# Patient Record
(demographics unavailable — no encounter records)

---

## 2025-01-25 NOTE — REASON FOR VISIT
[Symptom and Test Evaluation] : symptom and test evaluation [FreeTextEntry1] : 48 y/o M with no PMH presents for cardiac eval for symptoms of palpitations   He notes that he has been having palpitations for approx 3 months, its daily, random comes and goes not often but feels dizziness  Shortness of breath - works out and get up quick and shortness of breath  Walks a mile every morning and no shortness of breath but when getting up the or going up stairs he feels shortness of breath   Noting has had to stop working out 1 month of having chest discomfort and has to stop  no swelling of the lower extremity orthopnea or PND  Was previously diagnosed with hypertensions was on medication and changed diet and had interval imporvement in blood pressure    smoker: no smoking  Family hX: Mother- stroke Afib  and GM stroke  2 yrs ago

## 2025-01-25 NOTE — ASSESSMENT
[FreeTextEntry1] : 46 y/o M with no PMH presents for cardiac eval for symptoms of palpitations   1) Palpitations with exertional shortness of breath  - no chest pain but shortness of breath on exertion with palpitations  - EKG reviewed shows Sinus Rhythm @ 87 bpm with possible Old anteroseptal infarct, age indeterminate  - will refer for 3 day holter monitor  - refer for TTE to assess LV function and vaulvopathy  - refer for exercise stress test and assess for any EKG changes with exercise and see if there is a hypertensive response  - requesting blood work CBC CMP TSH LipidPanel and Lipoprotein A   Maria E Stuart D.O. [facility] Cardiology/Vascular Cardiology -[facility] Cardiology Telephone # [phone]

## 2025-01-25 NOTE — CARDIOLOGY SUMMARY
[de-identified] : Sinus Rhythm @ 87 bpm with possible Old anteroseptal infarct, age indeterminate

## 2025-01-25 NOTE — ASSESSMENT
[FreeTextEntry1] : 46 y/o M with no PMH presents for cardiac eval for symptoms of palpitations   1) Palpitations with exertional shortness of breath  - no chest pain but shortness of breath on exertion with palpitations  - EKG reviewed shows Sinus Rhythm @ 87 bpm with possible Old anteroseptal infarct, age indeterminate  - will refer for 3 day holter monitor  - refer for TTE to assess LV function and vaulvopathy  - refer for exercise stress test and assess for any EKG changes with exercise and see if there is a hypertensive response  - requesting blood work CBC CMP TSH LipidPanel and Lipoprotein A   Maria E Stuart D.O. [facility] Cardiology/Vascular Cardiology -[facility] Cardiology Telephone # [phone]

## 2025-01-25 NOTE — CARDIOLOGY SUMMARY
[de-identified] : Sinus Rhythm @ 87 bpm with possible Old anteroseptal infarct, age indeterminate

## 2025-03-18 NOTE — ASSESSMENT
[FreeTextEntry1] : 48 y/o M with PMH of Hypertension, Dyslipidemia possibly familial hypercholesterolemia,  and pre DM2 (HgbA1c 6%) presents for cardiac follow up    1) Hypertension  - blood pressure uncontrolled with no headhache dizziness lightheadedness or syncope  - EKG done shows Sinus Rhythm @ 87 bpm with possible Old anteroseptal infarct, age indeterminate;  EKG was obtained to assist in the diagnosed and management of assessed problem(s)  - Exercise stress test reviewed and shows no acute EKG changes associated with ischemia  - TTE done shows moderate LVH with normal diastolic and RV functoin and size  - will recommend to increase the Nifedipine to 90mg po q daily  - encourage dietary and lifestyle modification and Advised to partake in at least 150 mins/week of moderate intensity exercise like brisk walking. - will need to do ambulatory blood pressure checks; if perisstent may consider US renal duplex for further eval   2) LDL > 190, possible familial hypercholesterolemia  - familys hx of CAD and stroke  - based on this with Lipoprotein A 99.1, will refer for genetic testing  - refer for CT calcium score for further assessment  - will start Crestor 20mg and will need to repeat LDL and Liporotein A and if no evidence of adequate decline in LDL by 30-49% will consider further escalating and started ASA 81mg po q daily   3) Pre DM2 - disucssed dietayr and lifestyle modification and should be repeated in 3 motnhs    Patient was advised to contact the office or seek emergency medical care for any new, worsening or concerning symptoms. Patient verbalized understanding and is in agreement with the above plan.  Maria E Stuart D.O. [facility] Cardiology/Vascular Cardiology -[facility] Cardiology Telephone # [phone] Dressing: bandage

## 2025-03-18 NOTE — REASON FOR VISIT
[Symptom and Test Evaluation] : symptom and test evaluation [FreeTextEntry1] : 46 y/o M with PMH of Hypertension, Dyslipidemia possibly familial hypercholesterolemia,  and pre DM2 (HgbA1c 6%) presents for cardiac follow up   patient notes that since the last visit the blood pressure has been uncontrolled and has been incorporating healthy diet and started exercising again with no chest pain or shortness of breath as compared to prior viist.  LDL cholesterol done shows LDL of  225 and Lipoprotein A elevated 99.1 and discussed his underlying risk for CAD and stroke, recommended starting a statin and ASA; based on LDL > 190 will eval with genetic testing for Familial hypercholesterolemia.    smoker: no smoking  Family hX: Mother- stroke Afib  and GM stroke  2 yrs ago

## 2025-03-18 NOTE — ASSESSMENT
[FreeTextEntry1] : 46 y/o M with PMH of Hypertension, Dyslipidemia possibly familial hypercholesterolemia,  and pre DM2 (HgbA1c 6%) presents for cardiac follow up    1) Hypertension  - blood pressure uncontrolled with no headhache dizziness lightheadedness or syncope  - EKG done shows Sinus Rhythm @ 87 bpm with possible Old anteroseptal infarct, age indeterminate;  EKG was obtained to assist in the diagnosed and management of assessed problem(s)  - Exercise stress test reviewed and shows no acute EKG changes associated with ischemia  - TTE done shows moderate LVH with normal diastolic and RV functoin and size  - will recommend to increase the Nifedipine to 90mg po q daily  - encourage dietary and lifestyle modification and Advised to partake in at least 150 mins/week of moderate intensity exercise like brisk walking. - will need to do ambulatory blood pressure checks; if perisstent may consider US renal duplex for further eval   2) LDL > 190, possible familial hypercholesterolemia  - familys hx of CAD and stroke  - based on this with Lipoprotein A 99.1, will refer for genetic testing  - refer for CT calcium score for further assessment  - will start Crestor 20mg and will need to repeat LDL and Liporotein A and if no evidence of adequate decline in LDL by 30-49% will consider further escalating and started ASA 81mg po q daily   3) Pre DM2 - disucssed dietayr and lifestyle modification and should be repeated in 3 motnhs    Patient was advised to contact the office or seek emergency medical care for any new, worsening or concerning symptoms. Patient verbalized understanding and is in agreement with the above plan.  Maria E Stuart D.O. [facility] Cardiology/Vascular Cardiology -[facility] Cardiology Telephone # [phone]

## 2025-03-18 NOTE — CARDIOLOGY SUMMARY
[de-identified] : Sinus Rhythm @ 87 bpm with possible Old anteroseptal infarct, age indeterminate

## 2025-03-18 NOTE — CARDIOLOGY SUMMARY
[de-identified] : Sinus Rhythm @ 87 bpm with possible Old anteroseptal infarct, age indeterminate